# Patient Record
Sex: FEMALE | Race: WHITE | NOT HISPANIC OR LATINO | Employment: OTHER | URBAN - METROPOLITAN AREA
[De-identification: names, ages, dates, MRNs, and addresses within clinical notes are randomized per-mention and may not be internally consistent; named-entity substitution may affect disease eponyms.]

---

## 2017-10-24 ENCOUNTER — TRANSCRIBE ORDERS (OUTPATIENT)
Dept: ADMINISTRATIVE | Facility: HOSPITAL | Age: 67
End: 2017-10-24

## 2017-10-24 DIAGNOSIS — Z12.31 VISIT FOR SCREENING MAMMOGRAM: Primary | ICD-10-CM

## 2017-11-30 ENCOUNTER — HOSPITAL ENCOUNTER (OUTPATIENT)
Dept: MAMMOGRAPHY | Facility: HOSPITAL | Age: 67
Discharge: HOME | End: 2017-11-30
Attending: OBSTETRICS & GYNECOLOGY | Admitting: OBSTETRICS & GYNECOLOGY

## 2017-11-30 DIAGNOSIS — Z12.31 VISIT FOR SCREENING MAMMOGRAM: ICD-10-CM

## 2017-11-30 PROCEDURE — G0202 SCR MAMMO BI INCL CAD: HCPCS | Performed by: RADIOLOGY

## 2017-11-30 PROCEDURE — 77063 BREAST TOMOSYNTHESIS BI: CPT | Performed by: RADIOLOGY

## 2017-11-30 PROCEDURE — G0202 SCR MAMMO BI INCL CAD: HCPCS

## 2017-11-30 PROCEDURE — 77063 BREAST TOMOSYNTHESIS BI: CPT

## 2018-10-19 ENCOUNTER — TRANSCRIBE ORDERS (OUTPATIENT)
Dept: ADMINISTRATIVE | Facility: HOSPITAL | Age: 68
End: 2018-10-19

## 2018-10-19 DIAGNOSIS — Z12.31 VISIT FOR SCREENING MAMMOGRAM: Primary | ICD-10-CM

## 2018-12-11 ENCOUNTER — HOSPITAL ENCOUNTER (OUTPATIENT)
Dept: MAMMOGRAPHY | Facility: HOSPITAL | Age: 68
Discharge: HOME | End: 2018-12-11
Attending: OBSTETRICS & GYNECOLOGY | Admitting: OBSTETRICS & GYNECOLOGY

## 2018-12-11 DIAGNOSIS — Z12.31 VISIT FOR SCREENING MAMMOGRAM: ICD-10-CM

## 2018-12-11 PROCEDURE — 77063 BREAST TOMOSYNTHESIS BI: CPT | Performed by: RADIOLOGY

## 2018-12-11 PROCEDURE — 77067 SCR MAMMO BI INCL CAD: CPT | Performed by: RADIOLOGY

## 2018-12-11 PROCEDURE — 77063 BREAST TOMOSYNTHESIS BI: CPT

## 2018-12-11 PROCEDURE — 77067 SCR MAMMO BI INCL CAD: CPT

## 2018-12-20 ENCOUNTER — HOSPITAL ENCOUNTER (OUTPATIENT)
Dept: ULTRASOUND IMAGING | Facility: HOSPITAL | Age: 68
Discharge: HOME | End: 2018-12-20

## 2018-12-20 ENCOUNTER — HOSPITAL ENCOUNTER (OUTPATIENT)
Dept: MAMMOGRAPHY | Facility: HOSPITAL | Age: 68
Discharge: HOME | End: 2018-12-20
Admitting: RADIOLOGY

## 2018-12-20 ENCOUNTER — TRANSCRIBE ORDERS (OUTPATIENT)
Dept: MAMMOGRAPHY | Facility: HOSPITAL | Age: 68
End: 2018-12-20

## 2018-12-20 DIAGNOSIS — R92.8 ABNORMAL MAMMOGRAM: ICD-10-CM

## 2018-12-20 DIAGNOSIS — R92.8 ABNORMAL MAMMOGRAM: Primary | ICD-10-CM

## 2018-12-20 PROCEDURE — 76642 ULTRASOUND BREAST LIMITED: CPT

## 2018-12-20 PROCEDURE — G0279 TOMOSYNTHESIS, MAMMO: HCPCS

## 2018-12-20 PROCEDURE — G0279 TOMOSYNTHESIS, MAMMO: HCPCS | Performed by: RADIOLOGY

## 2018-12-20 PROCEDURE — 76642 ULTRASOUND BREAST LIMITED: CPT | Performed by: RADIOLOGY

## 2018-12-20 PROCEDURE — 77065 DX MAMMO INCL CAD UNI: CPT

## 2018-12-20 PROCEDURE — 77065 DX MAMMO INCL CAD UNI: CPT | Performed by: RADIOLOGY

## 2019-06-25 ENCOUNTER — TRANSCRIBE ORDERS (OUTPATIENT)
Dept: MAMMOGRAPHY | Facility: HOSPITAL | Age: 69
End: 2019-06-25

## 2019-06-25 ENCOUNTER — HOSPITAL ENCOUNTER (OUTPATIENT)
Dept: MAMMOGRAPHY | Facility: HOSPITAL | Age: 69
Discharge: HOME | End: 2019-06-25
Attending: OBSTETRICS & GYNECOLOGY | Admitting: OBSTETRICS & GYNECOLOGY

## 2019-06-25 DIAGNOSIS — Z12.31 VISIT FOR SCREENING MAMMOGRAM: Primary | ICD-10-CM

## 2019-06-25 DIAGNOSIS — R92.8 ABNORMAL MAMMOGRAM: ICD-10-CM

## 2019-06-25 PROCEDURE — 77065 DX MAMMO INCL CAD UNI: CPT | Performed by: RADIOLOGY

## 2019-06-25 PROCEDURE — 77065 DX MAMMO INCL CAD UNI: CPT

## 2019-12-26 ENCOUNTER — APPOINTMENT (OUTPATIENT)
Dept: MAMMOGRAPHY | Facility: HOSPITAL | Age: 69
End: 2019-12-26

## 2020-02-10 ENCOUNTER — HOSPITAL ENCOUNTER (OUTPATIENT)
Dept: MAMMOGRAPHY | Facility: HOSPITAL | Age: 70
Discharge: HOME | End: 2020-02-10
Admitting: OBSTETRICS & GYNECOLOGY

## 2020-02-10 DIAGNOSIS — Z12.31 VISIT FOR SCREENING MAMMOGRAM: ICD-10-CM

## 2020-02-10 PROCEDURE — 77063 BREAST TOMOSYNTHESIS BI: CPT

## 2020-02-10 PROCEDURE — 77067 SCR MAMMO BI INCL CAD: CPT

## 2020-02-10 PROCEDURE — 77067 SCR MAMMO BI INCL CAD: CPT | Performed by: RADIOLOGY

## 2020-02-10 PROCEDURE — 77063 BREAST TOMOSYNTHESIS BI: CPT | Performed by: RADIOLOGY

## 2021-01-05 ENCOUNTER — TRANSCRIBE ORDERS (OUTPATIENT)
Dept: ADMINISTRATIVE | Facility: HOSPITAL | Age: 71
End: 2021-01-05

## 2021-01-05 DIAGNOSIS — Z12.31 VISIT FOR SCREENING MAMMOGRAM: Primary | ICD-10-CM

## 2021-03-11 ENCOUNTER — APPOINTMENT (OUTPATIENT)
Dept: MAMMOGRAPHY | Facility: HOSPITAL | Age: 71
End: 2021-03-11

## 2021-05-12 ENCOUNTER — HOSPITAL ENCOUNTER (OUTPATIENT)
Dept: MAMMOGRAPHY | Facility: HOSPITAL | Age: 71
Discharge: HOME | End: 2021-05-12
Admitting: OBSTETRICS & GYNECOLOGY

## 2021-05-12 DIAGNOSIS — Z12.31 VISIT FOR SCREENING MAMMOGRAM: ICD-10-CM

## 2021-05-12 PROCEDURE — 77067 SCR MAMMO BI INCL CAD: CPT | Performed by: RADIOLOGY

## 2021-05-12 PROCEDURE — 77063 BREAST TOMOSYNTHESIS BI: CPT

## 2021-05-12 PROCEDURE — 77063 BREAST TOMOSYNTHESIS BI: CPT | Performed by: RADIOLOGY

## 2021-05-12 PROCEDURE — 77067 SCR MAMMO BI INCL CAD: CPT

## 2022-05-16 ENCOUNTER — TRANSCRIBE ORDERS (OUTPATIENT)
Dept: ADMINISTRATIVE | Facility: HOSPITAL | Age: 72
End: 2022-05-16

## 2022-05-16 DIAGNOSIS — Z12.31 VISIT FOR SCREENING MAMMOGRAM: Primary | ICD-10-CM

## 2022-06-08 ENCOUNTER — HOSPITAL ENCOUNTER (OUTPATIENT)
Dept: MAMMOGRAPHY | Facility: HOSPITAL | Age: 72
Discharge: HOME | End: 2022-06-08
Admitting: OBSTETRICS & GYNECOLOGY

## 2022-06-08 DIAGNOSIS — Z12.31 VISIT FOR SCREENING MAMMOGRAM: ICD-10-CM

## 2022-06-08 PROCEDURE — 77067 SCR MAMMO BI INCL CAD: CPT | Performed by: RADIOLOGY

## 2022-06-08 PROCEDURE — 77063 BREAST TOMOSYNTHESIS BI: CPT

## 2022-06-08 PROCEDURE — 77067 SCR MAMMO BI INCL CAD: CPT

## 2022-06-08 PROCEDURE — 77063 BREAST TOMOSYNTHESIS BI: CPT | Performed by: RADIOLOGY

## 2022-07-29 ENCOUNTER — HOSPITAL ENCOUNTER (OUTPATIENT)
Dept: MAMMOGRAPHY | Facility: HOSPITAL | Age: 72
Discharge: HOME | End: 2022-07-29
Admitting: RADIOLOGY

## 2022-07-29 DIAGNOSIS — R92.8 ABNORMAL MAMMOGRAM: ICD-10-CM

## 2022-07-29 PROCEDURE — G0279 TOMOSYNTHESIS, MAMMO: HCPCS | Performed by: RADIOLOGY

## 2022-07-29 PROCEDURE — 77065 DX MAMMO INCL CAD UNI: CPT

## 2022-07-29 PROCEDURE — G0279 TOMOSYNTHESIS, MAMMO: HCPCS

## 2022-07-29 PROCEDURE — 77065 DX MAMMO INCL CAD UNI: CPT | Performed by: RADIOLOGY

## 2022-08-11 ENCOUNTER — APPOINTMENT (OUTPATIENT)
Dept: MAMMOGRAPHY | Facility: HOSPITAL | Age: 72
End: 2022-08-11

## 2023-05-10 ENCOUNTER — TRANSCRIBE ORDERS (OUTPATIENT)
Dept: ADMINISTRATIVE | Facility: HOSPITAL | Age: 73
End: 2023-05-10
Payer: MEDICARE

## 2023-05-10 DIAGNOSIS — Z12.31 VISIT FOR SCREENING MAMMOGRAM: Primary | ICD-10-CM

## 2023-06-09 ENCOUNTER — HOSPITAL ENCOUNTER (OUTPATIENT)
Dept: MAMMOGRAPHY | Facility: HOSPITAL | Age: 73
Discharge: HOME | End: 2023-06-09
Admitting: OBSTETRICS & GYNECOLOGY
Payer: MEDICARE

## 2023-06-09 DIAGNOSIS — Z12.31 VISIT FOR SCREENING MAMMOGRAM: ICD-10-CM

## 2023-06-09 PROCEDURE — 77063 BREAST TOMOSYNTHESIS BI: CPT

## 2023-06-09 PROCEDURE — 77067 SCR MAMMO BI INCL CAD: CPT

## 2024-04-30 ENCOUNTER — TRANSCRIBE ORDERS (OUTPATIENT)
Dept: ADMINISTRATIVE | Facility: HOSPITAL | Age: 74
End: 2024-04-30
Payer: MEDICARE

## 2024-04-30 DIAGNOSIS — Z12.31 SCREENING MAMMOGRAM FOR BREAST CANCER: Primary | ICD-10-CM

## 2024-06-10 ENCOUNTER — HOSPITAL ENCOUNTER (OUTPATIENT)
Dept: MAMMOGRAPHY | Facility: HOSPITAL | Age: 74
Discharge: HOME | End: 2024-06-10
Admitting: OBSTETRICS & GYNECOLOGY
Payer: MEDICARE

## 2024-06-10 DIAGNOSIS — Z12.31 SCREENING MAMMOGRAM FOR BREAST CANCER: ICD-10-CM

## 2024-06-10 PROCEDURE — 77067 SCR MAMMO BI INCL CAD: CPT

## 2024-06-10 PROCEDURE — 77063 BREAST TOMOSYNTHESIS BI: CPT

## 2025-02-10 ENCOUNTER — TELEPHONE (OUTPATIENT)
Dept: INTERNAL MEDICINE | Facility: CLINIC | Age: 75
End: 2025-02-10
Payer: MEDICARE

## 2025-02-10 NOTE — TELEPHONE ENCOUNTER
Caller: Lashanda Mccray    Relationship to patient: Self    Best call back number: [phone]     Chief complaint: NOTHING SPECIFIC    Type of visit: NEW PATIENT    Requested date:  BETWEEN NOW AND APRIL    If rescheduling, when is the original appointment: 2/11/25     Additional notes:

## 2025-03-12 ENCOUNTER — OFFICE VISIT (OUTPATIENT)
Dept: INTERNAL MEDICINE | Facility: CLINIC | Age: 75
End: 2025-03-12
Payer: MEDICARE

## 2025-03-12 VITALS
TEMPERATURE: 98 F | RESPIRATION RATE: 17 BRPM | DIASTOLIC BLOOD PRESSURE: 82 MMHG | SYSTOLIC BLOOD PRESSURE: 132 MMHG | HEART RATE: 76 BPM | WEIGHT: 168 LBS

## 2025-03-12 DIAGNOSIS — Z00.00 HEALTHCARE MAINTENANCE: ICD-10-CM

## 2025-03-12 DIAGNOSIS — Z23 IMMUNIZATION DUE: ICD-10-CM

## 2025-03-12 DIAGNOSIS — H93.13 TINNITUS OF BOTH EARS: Primary | ICD-10-CM

## 2025-03-12 DIAGNOSIS — Z13.6 SCREENING FOR CARDIOVASCULAR CONDITION: ICD-10-CM

## 2025-03-12 LAB
BASOPHILS # BLD AUTO: 0.07 10*3/MM3 (ref 0–0.2)
BASOPHILS NFR BLD AUTO: 0.9 % (ref 0–1.5)
BILIRUB BLD-MCNC: NEGATIVE MG/DL
CLARITY, POC: CLEAR
COLOR UR: YELLOW
DEPRECATED RDW RBC AUTO: 42.2 FL (ref 37–54)
EOSINOPHIL # BLD AUTO: 0.2 10*3/MM3 (ref 0–0.4)
EOSINOPHIL NFR BLD AUTO: 2.4 % (ref 0.3–6.2)
ERYTHROCYTE [DISTWIDTH] IN BLOOD BY AUTOMATED COUNT: 12.3 % (ref 12.3–15.4)
EXPIRATION DATE: ABNORMAL
GLUCOSE UR STRIP-MCNC: NEGATIVE MG/DL
HCT VFR BLD AUTO: 42.9 % (ref 34–46.6)
HGB BLD-MCNC: 14.4 G/DL (ref 12–15.9)
IMM GRANULOCYTES # BLD AUTO: 0.03 10*3/MM3 (ref 0–0.05)
IMM GRANULOCYTES NFR BLD AUTO: 0.4 % (ref 0–0.5)
KETONES UR QL: ABNORMAL
LEUKOCYTE EST, POC: NEGATIVE
LYMPHOCYTES # BLD AUTO: 1.99 10*3/MM3 (ref 0.7–3.1)
LYMPHOCYTES NFR BLD AUTO: 24.2 % (ref 19.6–45.3)
Lab: ABNORMAL
MCH RBC QN AUTO: 31.5 PG (ref 26.6–33)
MCHC RBC AUTO-ENTMCNC: 33.6 G/DL (ref 31.5–35.7)
MCV RBC AUTO: 93.9 FL (ref 79–97)
MONOCYTES # BLD AUTO: 0.63 10*3/MM3 (ref 0.1–0.9)
MONOCYTES NFR BLD AUTO: 7.7 % (ref 5–12)
NEUTROPHILS NFR BLD AUTO: 5.29 10*3/MM3 (ref 1.7–7)
NEUTROPHILS NFR BLD AUTO: 64.4 % (ref 42.7–76)
NITRITE UR-MCNC: NEGATIVE MG/ML
NRBC BLD AUTO-RTO: 0 /100 WBC (ref 0–0.2)
PH UR: 6 [PH] (ref 5–8)
PLATELET # BLD AUTO: 315 10*3/MM3 (ref 140–450)
PMV BLD AUTO: 10.3 FL (ref 6–12)
PROT UR STRIP-MCNC: NEGATIVE MG/DL
RBC # BLD AUTO: 4.57 10*6/MM3 (ref 3.77–5.28)
RBC # UR STRIP: NEGATIVE /UL
SP GR UR: 1.02 (ref 1–1.03)
UROBILINOGEN UR QL: NORMAL
WBC NRBC COR # BLD AUTO: 8.21 10*3/MM3 (ref 3.4–10.8)

## 2025-03-12 PROCEDURE — 80061 LIPID PANEL: CPT | Performed by: INTERNAL MEDICINE

## 2025-03-12 PROCEDURE — 84443 ASSAY THYROID STIM HORMONE: CPT | Performed by: INTERNAL MEDICINE

## 2025-03-12 PROCEDURE — 80053 COMPREHEN METABOLIC PANEL: CPT | Performed by: INTERNAL MEDICINE

## 2025-03-12 PROCEDURE — 85025 COMPLETE CBC W/AUTO DIFF WBC: CPT | Performed by: INTERNAL MEDICINE

## 2025-03-12 RX ORDER — CHOLECALCIFEROL (VITAMIN D3) 25 MCG
2000 TABLET ORAL DAILY
COMMUNITY

## 2025-03-12 RX ORDER — ESTRADIOL 0.07 MG/D
1 FILM, EXTENDED RELEASE TRANSDERMAL 2 TIMES WEEKLY
COMMUNITY

## 2025-03-12 RX ORDER — VITAMIN E 200 UNIT
CAPSULE ORAL
COMMUNITY

## 2025-03-12 NOTE — LETTER
[facility]  Vaccine Consent Form    Patient Name:  Lashanda cMcray  Patient :  1950     Vaccine(s) Ordered    Pneumococcal Conjugate Vaccine 20-Valent All        Screening Checklist  The following questions should be completed prior to vaccination. If you answer “yes” to any question, it does not necessarily mean you should not be vaccinated. It just means we may need to clarify or ask more questions. If a question is unclear, please ask your healthcare provider to explain it.    Yes No   Any fever or moderate to severe illness today (mild illness and/or antibiotic treatment are not contraindications)?     Do you have a history of a serious reaction to any previous vaccinations, such as anaphylaxis, encephalopathy within 7 days, Guillain-[place] syndrome within 6 weeks, seizure?     Have you received any live vaccine(s) (e.g MMR, PAM) or any other vaccines in the last month (to ensure duplicate doses aren't given)?     Do you have an anaphylactic allergy to latex (DTaP, DTaP-IPV, Hep A, Hep B, MenB, RV, Td, Tdap), baker’s yeast (Hep B, HPV), polysorbates (RSV, nirsevimab, PCV 20, Rotavirrus, Tdap, Shingrix), or gelatin (PAM, MMR)?     Do you have an anaphylactic allergy to neomycin (Rabies, PAM, MMR, IPV, Hep A), polymyxin B (IPV), or streptomycin (IPV)?      Any cancer, leukemia, AIDS, or other immune system disorder? (PAM, MMR, RV)     Do you have a parent, brother, or sister with an immune system problem (if immune competence of vaccine recipient clinically verified, can proceed)? (MMR, PAM)     Any recent steroid treatments for >2 weeks, chemotherapy, or radiation treatment? (PAM, MMR)     Have you received antibody-containing blood transfusions or IVIG in the past 11 months (recommended interval is dependent on product)? (MMR, PAM)     Have you taken antiviral drugs (acyclovir, famciclovir, valacyclovir for PAM) in the last 24 or 48 hours, respectively?      Are you pregnant or planning to become  "pregnant within 1 month? (PAM, MMR, HPV, IPV, MenB, Abrexvy; For Hep B- refer to Engerix-B; For RSV - Abrysvo is indicated for 32-36 weeks of pregnancy from September to January)     For infants, have you ever been told your child has had intussusception or a medical emergency involving obstruction of the intestine (Rotavirus)? If not for an infant, can skip this question.         *Ordering Physicians/APC should be consulted if \"yes\" is checked by the patient or guardian above.  I have received, read, and understand the Vaccine Information Statement (VIS) for each vaccine ordered.  I have considered my or my child's health status as well as the health status of my close contacts.  I have taken the opportunity to discuss my vaccine questions with my or my child's health care provider.   I have requested that the ordered vaccine(s) be given to me or my child.  I understand the benefits and risks of the vaccines.  I understand that I should remain in the clinic for 15 minutes after receiving the vaccine(s).  _________________________________________________________  Signature of Patient or Parent/Legal Guardian ____________________  Date     "

## 2025-03-13 LAB
ALBUMIN SERPL-MCNC: 4.1 G/DL (ref 3.5–5.2)
ALBUMIN/GLOB SERPL: 1.2 G/DL
ALP SERPL-CCNC: 86 U/L (ref 39–117)
ALT SERPL W P-5'-P-CCNC: 14 U/L (ref 1–33)
ANION GAP SERPL CALCULATED.3IONS-SCNC: 13.8 MMOL/L (ref 5–15)
AST SERPL-CCNC: 20 U/L (ref 1–32)
BILIRUB SERPL-MCNC: 0.4 MG/DL (ref 0–1.2)
BUN SERPL-MCNC: 12 MG/DL (ref 8–23)
BUN/CREAT SERPL: 17.1 (ref 7–25)
CALCIUM SPEC-SCNC: 9.7 MG/DL (ref 8.6–10.5)
CHLORIDE SERPL-SCNC: 101 MMOL/L (ref 98–107)
CHOLEST SERPL-MCNC: 213 MG/DL (ref 0–200)
CO2 SERPL-SCNC: 24.2 MMOL/L (ref 22–29)
CREAT SERPL-MCNC: 0.7 MG/DL (ref 0.57–1)
EGFRCR SERPLBLD CKD-EPI 2021: 91.5 ML/MIN/1.73
GLOBULIN UR ELPH-MCNC: 3.4 GM/DL
GLUCOSE SERPL-MCNC: 79 MG/DL (ref 65–99)
HDLC SERPL-MCNC: 50 MG/DL (ref 40–60)
LDLC SERPL CALC-MCNC: 139 MG/DL (ref 0–100)
LDLC/HDLC SERPL: 2.72 {RATIO}
POTASSIUM SERPL-SCNC: 4.6 MMOL/L (ref 3.5–5.2)
PROT SERPL-MCNC: 7.5 G/DL (ref 6–8.5)
SODIUM SERPL-SCNC: 139 MMOL/L (ref 136–145)
TRIGL SERPL-MCNC: 135 MG/DL (ref 0–150)
TSH SERPL DL<=0.05 MIU/L-ACNC: 1.93 UIU/ML (ref 0.27–4.2)
VLDLC SERPL-MCNC: 24 MG/DL (ref 5–40)

## 2025-03-13 NOTE — PROGRESS NOTES
Chief Complaint   Patient presents with    Tinnitus    Establish Care       History of Present Illness      She reports tinnitus in both ears of a greater than one year duration. There is no ear pain. She has not had exposure to loud noises throughout her life. There is no family history of hearing loss reported. There are no other symptoms including a dry cough, a wet cough, wheezing, fever, facial pain, a headache, eye drainage, ear drainage, nasal congestion, a rash, nausea, vomiting, diarrhea, chills, decreased appetite, abdominal pain, sore throat or myalgias. The tinnitus is high frequency.    Medications      Current Outpatient Medications:     Cholecalciferol 25 MCG (1000 UT) tablet, Take 2 tablets by mouth Daily., Disp: , Rfl:     estradiol (MINIVELLE, VIVELLE-DOT) 0.075 MG/24HR patch, Place 1 patch on the skin as directed by provider 2 (Two) Times a Week., Disp: , Rfl:     MegaRed Omega-3 Krill Oil 500 MG capsule, Take  by mouth., Disp: , Rfl:     multivitamin with minerals (Centrum Silver 50+Women) tablet tablet, Take 1 tablet by mouth Daily., Disp: , Rfl:      Allergies    Allergies   Allergen Reactions    Sulfa Antibiotics Other (See Comments)     chills       Problem List    There is no problem list on file for this patient.      Medications, Allergies, Problems List and Past History were reviewed and updated.    Physical Examination    /82   Pulse 76   Temp 98 °F (36.7 °C)   Resp 17   Wt 76.2 kg (168 lb)       HEENT: Head- Normocephalic Atraumatic. Facies- Within normal limits. Pinnas- Normal texture and shape bilaterally. Canals- Normal bilaterally. TMs- Normal bilaterally. Nares- Patent bilaterally. Nasal Septum- is normal. There is no tenderness to palpation over the frontal or maxillary sinuses. Lids- Normal bilaterally. Conjunctiva- Clear bilaterally. Sclera- Anicteric bilaterally. Oropharynx- Moist with no lesions. Tonsils- No enlargement, erythema or exudate.    Neck: Thyroid- non  enlarged, symmetric and has no nodules. No bruits are detected. ROM- Normal Range of Motion with no rigidity.    Lungs: Auscultation- Clear to auscultation bilaterally. There are no retractions, clubbing or cyanosis. The Expiratory to Inspiratory ratio is equal.    Lymph Nodes: Cervical- no enlarged lymph nodes noted.    Cardiovascular: There are no carotid bruits. Heart- Normal Rate with Regular rhythm and no murmurs. There are no gallops. There are no rubs. In the lower extremities there is no edema. The upper extremities do not have edema.    Abdomen: Soft, benign, non-tender with no masses, hernias, organomegaly or scars.    Breast: Normal contours bilaterally with no masses, discharge, skin changes or lumps. There are no scars noted.    Impression and Assessment    Encounter for Immunization Administration.    Tinnitus.    Plan    Tinnitus Plan: The condition is stable. No change is needed in the current plan.    The following was ordered for screening and health maintenance: CBC w Automated Diff, CMP, Lipid Profile, TSH and U/A.    Counseled regarding immunizations and applicable VIS given.    Immunizations Ordered and Administered: Prevnar.    Diagnoses and all orders for this visit:    1. Tinnitus of both ears (Primary)  -     Comprehensive Metabolic Panel; Future  -     POC Urinalysis Dipstick, Automated; Future  -     Comprehensive Metabolic Panel  -     POC Urinalysis Dipstick, Automated    2. Screening for cardiovascular condition  -     Lipid Panel; Future  -     Lipid Panel    3. Immunization due  -     Pneumococcal Conjugate Vaccine 20-Valent All    4. Healthcare maintenance  -     CBC & Differential; Future  -     TSH; Future  -     CBC & Differential  -     TSH            Return to Office    The patient was instructed to return for follow-up in 6 months. The patient was instructed to return sooner if the condition changes, worsens, or does not resolve.

## 2025-05-16 ENCOUNTER — TRANSCRIBE ORDERS (OUTPATIENT)
Dept: ADMINISTRATIVE | Facility: HOSPITAL | Age: 75
End: 2025-05-16
Payer: MEDICARE

## 2025-05-16 DIAGNOSIS — Z12.31 ENCOUNTER FOR SCREENING MAMMOGRAM FOR MALIGNANT NEOPLASM OF BREAST: Primary | ICD-10-CM

## 2025-06-10 LAB
NCCN CRITERIA FLAG: NORMAL
TYRER CUZICK SCORE: 3.9

## 2025-06-11 ENCOUNTER — HOSPITAL ENCOUNTER (OUTPATIENT)
Dept: MAMMOGRAPHY | Facility: HOSPITAL | Age: 75
Discharge: HOME | End: 2025-06-11
Admitting: OBSTETRICS & GYNECOLOGY
Payer: MEDICARE

## 2025-06-11 DIAGNOSIS — Z12.31 ENCOUNTER FOR SCREENING MAMMOGRAM FOR MALIGNANT NEOPLASM OF BREAST: ICD-10-CM

## 2025-06-11 PROCEDURE — 77067 SCR MAMMO BI INCL CAD: CPT

## 2025-06-11 PROCEDURE — 77063 BREAST TOMOSYNTHESIS BI: CPT
